# Patient Record
Sex: FEMALE | Race: WHITE | NOT HISPANIC OR LATINO | URBAN - METROPOLITAN AREA
[De-identification: names, ages, dates, MRNs, and addresses within clinical notes are randomized per-mention and may not be internally consistent; named-entity substitution may affect disease eponyms.]

---

## 2019-03-12 ENCOUNTER — IMPORTED ENCOUNTER (OUTPATIENT)
Dept: URBAN - METROPOLITAN AREA CLINIC 31 | Facility: CLINIC | Age: 39
End: 2019-03-12

## 2019-03-12 PROCEDURE — 92015 DETERMINE REFRACTIVE STATE: CPT

## 2019-03-12 PROCEDURE — 92004 COMPRE OPH EXAM NEW PT 1/>: CPT

## 2022-04-02 ASSESSMENT — VISUAL ACUITY
OD_CC: J1+14''
OD_CC: 20/25
OS_CC: J114''
OS_PH: SC 20/30
OS_CC: 20/50

## 2022-04-02 ASSESSMENT — TONOMETRY
OS_IOP_MMHG: 13
OD_IOP_MMHG: 13

## 2022-07-09 ENCOUNTER — TELEPHONE ENCOUNTER (OUTPATIENT)
Dept: URBAN - METROPOLITAN AREA CLINIC 121 | Facility: CLINIC | Age: 42
End: 2022-07-09

## 2022-07-09 RX ORDER — TRAMADOL HYDROCHLORIDE 50 MG/1
TABLET ORAL TAKE AS DIRECTED
Refills: 0 | OUTPATIENT
Start: 2018-02-21 | End: 2018-02-23

## 2022-07-09 RX ORDER — FAMOTIDINE 20 MG/1
TABLET ORAL
Refills: 0 | OUTPATIENT
Start: 2017-06-24 | End: 2018-02-23

## 2022-07-09 RX ORDER — FAMOTIDINE 20 MG
TABLET ORAL ONCE A DAY
Refills: 0 | OUTPATIENT
Start: 2018-02-23 | End: 2018-03-23

## 2022-07-10 ENCOUNTER — TELEPHONE ENCOUNTER (OUTPATIENT)
Dept: URBAN - METROPOLITAN AREA CLINIC 121 | Facility: CLINIC | Age: 42
End: 2022-07-10

## 2022-07-10 RX ORDER — ESOMEPRAZOLE MAGNESIUM 40 MG
CAPSULE,DELAYED RELEASE (ENTERIC COATED) ORAL
Refills: 0 | Status: ACTIVE | COMMUNITY
Start: 2018-03-23

## 2022-07-30 ENCOUNTER — TELEPHONE ENCOUNTER (OUTPATIENT)
Age: 42
End: 2022-07-30

## 2022-07-31 ENCOUNTER — TELEPHONE ENCOUNTER (OUTPATIENT)
Age: 42
End: 2022-07-31

## 2023-09-15 ENCOUNTER — TELEPHONE ENCOUNTER (OUTPATIENT)
Dept: URBAN - METROPOLITAN AREA CLINIC 9 | Facility: CLINIC | Age: 43
End: 2023-09-15

## 2023-10-10 ENCOUNTER — OFFICE VISIT (OUTPATIENT)
Dept: URBAN - METROPOLITAN AREA CLINIC 9 | Facility: CLINIC | Age: 43
End: 2023-10-10
Payer: COMMERCIAL

## 2023-10-10 ENCOUNTER — LAB OUTSIDE AN ENCOUNTER (OUTPATIENT)
Dept: URBAN - METROPOLITAN AREA CLINIC 9 | Facility: CLINIC | Age: 43
End: 2023-10-10

## 2023-10-10 VITALS
HEIGHT: 64 IN | BODY MASS INDEX: 19.81 KG/M2 | WEIGHT: 116 LBS | SYSTOLIC BLOOD PRESSURE: 118 MMHG | DIASTOLIC BLOOD PRESSURE: 70 MMHG

## 2023-10-10 DIAGNOSIS — Z12.11 SCREENING FOR COLON CANCER: ICD-10-CM

## 2023-10-10 DIAGNOSIS — K21.9 GERD WITHOUT ESOPHAGITIS: ICD-10-CM

## 2023-10-10 DIAGNOSIS — B18.2 CHRONIC HEPATITIS C WITHOUT HEPATIC COMA: ICD-10-CM

## 2023-10-10 PROBLEM — 128302006: Status: ACTIVE | Noted: 2023-10-10

## 2023-10-10 PROCEDURE — 99204 OFFICE O/P NEW MOD 45 MIN: CPT | Performed by: INTERNAL MEDICINE

## 2023-10-10 RX ORDER — TRAMADOL HYDROCHLORIDE 50 MG/1
TAKE ONE TABLET BY MOUTH FOUR TIMES A DAY TABLET, COATED ORAL
Qty: 120 UNSPECIFIED | Refills: 1 | Status: ACTIVE | COMMUNITY

## 2023-10-10 RX ORDER — OMEPRAZOLE 20 MG/1
TAKE ONE CAPSULE BY MOUTH ONE TIME DAILY CAPSULE, DELAYED RELEASE ORAL
OUTPATIENT

## 2023-10-10 RX ORDER — ESOMEPRAZOLE MAGNESIUM 40 MG
CAPSULE,DELAYED RELEASE (ENTERIC COATED) ORAL
Refills: 0 | Status: ON HOLD | COMMUNITY
Start: 2018-03-23

## 2023-10-10 RX ORDER — ALPRAZOLAM 1 MG/1
TABLET ORAL
Qty: 60 TABLET | Status: ACTIVE | COMMUNITY

## 2023-10-10 RX ORDER — OMEPRAZOLE 20 MG/1
TAKE ONE CAPSULE BY MOUTH ONE TIME DAILY CAPSULE, DELAYED RELEASE ORAL
Qty: 90 UNSPECIFIED | Refills: 2 | Status: ACTIVE | COMMUNITY

## 2023-10-10 NOTE — HPI-TODAY'S VISIT:
Pt here for evaluation of elevated lfts and chronic HCV . pt believes she acquired form [facility] in 2023 Denies high risk behaviors, maternal history . 8 and 9/2023 LFts about 5-10 times elevated Ceruloplasmin neg Alpha 1 high AI evaluation neg Viral hep with chronic hepatitis C . 2023 US s/p ccx . Pt avg risk for colon cancer . 2018 EGD with medium HH, no krishnamurthy's, has been on ppi since due to failure of other agents . Pt did not have iron studies. She needs iron studies. Also needs HCV genotype and confirmation of ongoing viremia as there is some acuity to this but she has had it for 6 mo. Will plan fibroscan. Then plan tx at her f/u. Colon due in 5/2024. RTC 1 mo.  .

## 2023-10-14 LAB
ALBUMIN: 4.5
ALKALINE PHOSPHATASE: 45
ALT (SGPT): 77
AST (SGOT): 46
BILIRUBIN, DIRECT: 0.15
BILIRUBIN, TOTAL: 0.4
FERRITIN, SERUM: 79
HCV LOG10: 6.04
HEPATITIS C GENOTYPE: 3
HEPATITIS C QUANTITATION: (no result)
IRON BIND.CAP.(TIBC): 394
IRON SATURATION: 23
IRON: 90
PROTEIN, TOTAL: 6.6
UIBC: 304

## 2023-11-17 ENCOUNTER — TELEPHONE ENCOUNTER (OUTPATIENT)
Dept: URBAN - METROPOLITAN AREA CLINIC 9 | Facility: CLINIC | Age: 43
End: 2023-11-17

## 2023-11-17 ENCOUNTER — OFFICE VISIT (OUTPATIENT)
Dept: URBAN - METROPOLITAN AREA CLINIC 9 | Facility: CLINIC | Age: 43
End: 2023-11-17
Payer: COMMERCIAL

## 2023-11-17 VITALS
DIASTOLIC BLOOD PRESSURE: 70 MMHG | SYSTOLIC BLOOD PRESSURE: 116 MMHG | WEIGHT: 116 LBS | HEIGHT: 64 IN | BODY MASS INDEX: 19.81 KG/M2

## 2023-11-17 DIAGNOSIS — K21.9 GERD WITHOUT ESOPHAGITIS: ICD-10-CM

## 2023-11-17 DIAGNOSIS — B18.2 CHRONIC HEPATITIS C WITHOUT HEPATIC COMA: ICD-10-CM

## 2023-11-17 PROCEDURE — 99214 OFFICE O/P EST MOD 30 MIN: CPT | Performed by: INTERNAL MEDICINE

## 2023-11-17 RX ORDER — TRAMADOL HYDROCHLORIDE 50 MG/1
TAKE ONE TABLET BY MOUTH FOUR TIMES A DAY TABLET, COATED ORAL
Qty: 120 UNSPECIFIED | Refills: 1 | Status: ACTIVE | COMMUNITY

## 2023-11-17 RX ORDER — OMEPRAZOLE 20 MG/1
TAKE ONE CAPSULE BY MOUTH ONE TIME DAILY CAPSULE, DELAYED RELEASE ORAL
OUTPATIENT

## 2023-11-17 RX ORDER — ALPRAZOLAM 1 MG/1
TABLET ORAL
Qty: 60 TABLET | Status: ACTIVE | COMMUNITY

## 2023-11-17 RX ORDER — OMEPRAZOLE 20 MG/1
TAKE ONE CAPSULE BY MOUTH ONE TIME DAILY CAPSULE, DELAYED RELEASE ORAL
Status: ACTIVE | COMMUNITY

## 2023-11-17 RX ORDER — GLECAPREVIR AND PIBRENTASVIR 40; 100 MG/1; MG/1
3 TABLETS TABLET, FILM COATED ORAL ONCE A DAY
Qty: 84 TABLET | Refills: 1 | OUTPATIENT
Start: 2023-11-17 | End: 2024-01-12

## 2023-11-17 RX ORDER — ESOMEPRAZOLE MAGNESIUM 40 MG
CAPSULE,DELAYED RELEASE (ENTERIC COATED) ORAL
Refills: 0 | Status: ON HOLD | COMMUNITY
Start: 2018-03-23

## 2023-11-17 RX ORDER — GLECAPREVIR AND PIBRENTASVIR 40; 100 MG/1; MG/1
3 TABLETS TABLET, FILM COATED ORAL ONCE A DAY
Qty: 84 TABLET | Refills: 1
Start: 2023-11-17 | End: 2024-01-15

## 2023-11-17 NOTE — HPI-TODAY'S VISIT:
Pt here for evaluation of elevated lfts and chronic HCV . pt believes she acquired form [facility] in 2023 Denies high risk behaviors, maternal history . 8 and 9/2023 LFts about 5-10 times elevated Ceruloplasmin neg Alpha 1 high AI evaluation neg Fe studies normal Viral hep with chronic hepatitis C Genotype 3 HCV . 2023 US s/p ccx 2023 Fibroscan without fibrosis . Pt avg risk for colon cancer . 2018 EGD with medium HH, no krishnamurthy's, has been on ppi since due to failure of other agents . I reivewed iron studies, fibroscan and hcv genotype. pt here for tx for HCV tx naive genotype 3. Will initiate treatment with mavyret for 8 weeks. RTC 3 months.  .  .

## 2024-02-01 LAB
ALBUMIN/GLOBULIN RATIO: 1.8
ALBUMIN: 4.3
ALKALINE PHOSPHATASE: 101
ALT (SGPT): 12
AST (SGOT): 12
BILIRUBIN, DIRECT: 0.2
BILIRUBIN, INDIRECT: 0.5
BILIRUBIN, TOTAL: 0.7
GLOBULIN: 2.4
HCV RNA, QUANTITATIVE: (no result)
HCV RNA, QUANTITATIVE: (no result)
PROTEIN, TOTAL: 6.7

## 2024-02-20 ENCOUNTER — OV EP (OUTPATIENT)
Dept: URBAN - METROPOLITAN AREA CLINIC 9 | Facility: CLINIC | Age: 44
End: 2024-02-20
Payer: COMMERCIAL

## 2024-02-20 VITALS
SYSTOLIC BLOOD PRESSURE: 116 MMHG | DIASTOLIC BLOOD PRESSURE: 70 MMHG | HEIGHT: 64 IN | WEIGHT: 113 LBS | BODY MASS INDEX: 19.29 KG/M2

## 2024-02-20 DIAGNOSIS — Z12.11 SCREENING FOR COLON CANCER: ICD-10-CM

## 2024-02-20 DIAGNOSIS — B18.2 CHRONIC HEPATITIS C WITHOUT HEPATIC COMA: ICD-10-CM

## 2024-02-20 PROCEDURE — 99213 OFFICE O/P EST LOW 20 MIN: CPT | Performed by: INTERNAL MEDICINE

## 2024-02-20 RX ORDER — TRAMADOL HYDROCHLORIDE 50 MG/1
TAKE ONE TABLET BY MOUTH FOUR TIMES A DAY TABLET, COATED ORAL
Qty: 120 UNSPECIFIED | Refills: 1 | Status: ACTIVE | COMMUNITY

## 2024-02-20 RX ORDER — OMEPRAZOLE 20 MG/1
TAKE ONE CAPSULE BY MOUTH ONE TIME DAILY CAPSULE, DELAYED RELEASE ORAL
Status: ACTIVE | COMMUNITY

## 2024-02-20 RX ORDER — ALPRAZOLAM 1 MG/1
TABLET ORAL
Qty: 60 TABLET | Status: ACTIVE | COMMUNITY

## 2024-02-20 RX ORDER — ESOMEPRAZOLE MAGNESIUM 40 MG
CAPSULE,DELAYED RELEASE (ENTERIC COATED) ORAL
Refills: 0 | Status: ON HOLD | COMMUNITY
Start: 2018-03-23

## 2024-02-20 NOTE — HPI-TODAY'S VISIT:
Pt here for f/u of HCV . pt believes she acquired form [facility] in 2023 Denies high risk behaviors, maternal history . 8 and 9/2023 LFts about 5-10 times elevated Ceruloplasmin neg Alpha 1 high AI evaluation neg Fe studies normal Viral hep with chronic hepatitis C Genotype 3 HCV now successfully tx with 8 weeks of mayvret . 2023 US s/p ccx 2023 Fibroscan without fibrosis . Pt avg risk for colon cancer . 2018 EGD with medium HH, no krishnamurthy's, has been on ppi since due to failure of other agents . Pt here for f/u. She is now s/p 8 weeks of mayvret for her HCV and she has negative HCV PCR and normal lfts. She is doing great. She is cured form HCv and we will plan her colon after 5/2024 and f/u prn.  .

## 2024-06-03 ENCOUNTER — LAB OUTSIDE AN ENCOUNTER (OUTPATIENT)
Dept: URBAN - METROPOLITAN AREA CLINIC 9 | Facility: CLINIC | Age: 44
End: 2024-06-03